# Patient Record
Sex: FEMALE | Race: BLACK OR AFRICAN AMERICAN | NOT HISPANIC OR LATINO | Employment: FULL TIME | URBAN - METROPOLITAN AREA
[De-identification: names, ages, dates, MRNs, and addresses within clinical notes are randomized per-mention and may not be internally consistent; named-entity substitution may affect disease eponyms.]

---

## 2019-05-03 PROBLEM — M54.2 CERVICAL PAIN (NECK): Status: ACTIVE | Noted: 2018-03-05

## 2019-05-03 PROBLEM — E83.42 HYPOMAGNESEMIA: Status: ACTIVE | Noted: 2018-06-02

## 2019-05-03 PROBLEM — K21.9 GASTROESOPHAGEAL REFLUX DISEASE: Status: ACTIVE | Noted: 2017-12-11

## 2019-05-03 PROBLEM — J02.0 STREPTOCOCCAL SORE THROAT: Status: ACTIVE | Noted: 2017-12-11

## 2019-05-03 PROBLEM — M35.00 SJOGREN'S SYNDROME: Status: ACTIVE | Noted: 2017-10-20

## 2019-05-03 PROBLEM — R89.8 EOSINOPHIL COUNT RAISED: Status: ACTIVE | Noted: 2019-01-07

## 2019-05-03 PROBLEM — R10.9 ABDOMINAL PAIN: Status: ACTIVE | Noted: 2017-12-11

## 2019-05-03 PROBLEM — E87.6 HYPOKALEMIA: Status: ACTIVE | Noted: 2018-06-02

## 2019-05-03 PROBLEM — R00.0 TACHYCARDIA: Status: ACTIVE | Noted: 2018-12-31

## 2019-05-03 PROBLEM — R76.8 FALSE POSITIVE ANA: Status: ACTIVE | Noted: 2018-03-14

## 2019-05-03 PROBLEM — J45.901 EXACERBATION OF ASTHMA: Status: ACTIVE | Noted: 2018-06-02

## 2019-05-03 PROBLEM — J30.81 ALLERGIC RHINITIS DUE TO ANIMAL HAIR AND DANDER: Status: ACTIVE | Noted: 2018-03-26

## 2019-05-03 PROBLEM — R06.00 DYSPNEA: Status: ACTIVE | Noted: 2018-12-31

## 2019-05-03 PROBLEM — J18.9 PNEUMONIA: Status: ACTIVE | Noted: 2019-01-07

## 2019-07-08 PROBLEM — J30.1 SEASONAL ALLERGIC RHINITIS DUE TO POLLEN: Status: ACTIVE | Noted: 2019-07-08

## 2019-07-08 PROBLEM — J01.01 ACUTE RECURRENT MAXILLARY SINUSITIS: Status: ACTIVE | Noted: 2019-07-08

## 2020-02-19 PROBLEM — J33.9 NASAL POLYPOSIS: Status: ACTIVE | Noted: 2020-02-19

## 2020-02-28 PROBLEM — Z98.890 STATUS POST FUNCTIONAL ENDOSCOPIC SINUS SURGERY (FESS): Status: ACTIVE | Noted: 2020-02-28

## 2021-05-12 ENCOUNTER — PATIENT MESSAGE (OUTPATIENT)
Dept: RESEARCH | Facility: HOSPITAL | Age: 41
End: 2021-05-12

## 2023-01-05 PROBLEM — I70.90 ATHEROSCLEROSIS OF ARTERIES: Status: ACTIVE | Noted: 2023-01-05

## 2023-01-05 PROBLEM — R93.89 ABNORMAL CT SCAN: Status: ACTIVE | Noted: 2023-01-05

## 2023-01-05 PROBLEM — J45.909 ASTHMA: Status: ACTIVE | Noted: 2018-06-02

## 2023-02-17 PROBLEM — J18.9 PNEUMONIA: Status: RESOLVED | Noted: 2019-01-07 | Resolved: 2023-02-17

## 2023-02-17 PROBLEM — R10.9 ABDOMINAL PAIN: Status: RESOLVED | Noted: 2017-12-11 | Resolved: 2023-02-17

## 2023-02-17 PROBLEM — J02.0 STREPTOCOCCAL SORE THROAT: Status: RESOLVED | Noted: 2017-12-11 | Resolved: 2023-02-17

## 2023-04-06 ENCOUNTER — SPECIALTY PHARMACY (OUTPATIENT)
Dept: PHARMACY | Facility: CLINIC | Age: 43
End: 2023-04-06

## 2023-04-06 NOTE — TELEPHONE ENCOUNTER
PA submitted Key: [id number] - PA Case ID: [id number]    Galilea, this is Rita Munson with [facility].  We are working on your Fasenra prescription that your doctor has sent us. We will be working with your insurance to get this approved for you. We will be calling you along the way with updates on your medication.  If you have any questions, you can reach us at [phone].    Welcome call outcome: Patient/caregiver reached

## 2023-04-11 NOTE — TELEPHONE ENCOUNTER
PA approved. Request Reference Number: [id number]. FASENRA PEN INJ 30MG/ML is approved through 04/06/2024    Test claim: $0    Medicaid Insurance. BI/FA not required.  Forward to initial.

## 2023-04-14 ENCOUNTER — SPECIALTY PHARMACY (OUTPATIENT)
Dept: PHARMACY | Facility: CLINIC | Age: 43
End: 2023-04-14

## 2023-04-14 DIAGNOSIS — J45.50 SEVERE PERSISTENT ASTHMA WITHOUT COMPLICATION: Primary | ICD-10-CM

## 2023-04-14 NOTE — TELEPHONE ENCOUNTER
Specialty Pharmacy - Initial Clinical Assessment    Specialty Medication Orders Linked to Encounter      Flowsheet Row Most Recent Value   Medication #1 benralizumab (FASENRA PEN) 30 mg/mL AtIn (Order#[id number], Rx#[id number])   Medication #2 benralizumab (FASENRA PEN) 30 mg/mL AtIn (Order#[id number], Rx#[id number])          Patient Diagnosis   J45.50 - Severe persistent asthma without complication    Subjective    Isaak Hernandez is a 43 y.o. female, who is followed by the specialty pharmacy service for management and education.    Recent Encounters       Date Type Provider Description    04/14/2023 Specialty Pharmacy Silke Cody, PharmD Initial Clinical Assessment    04/06/2023 Specialty Pharmacy Rita Munson, YannickD Referral Authorization          Clinical call attempts since last clinical assessment   No call attempts found.     Current Outpatient Medications   Medication Sig    ALBUTEROL INHL Inhale into the lungs.    albuterol (PROVENTIL) 2.5 mg /3 mL (0.083 %) nebulizer solution 3 mLs.    aspirin (ECOTRIN) 81 MG EC tablet Take 81 mg by mouth once daily.    azelastine (ASTELIN) 137 mcg (0.1 %) nasal spray 1 spray (137 mcg total) by Nasal route 2 (two) times daily.    benralizumab (FASENRA PEN) 30 mg/mL AtIn Inject 30 mg into the skin every 28 days. Use this medication q 28 days for 3 doses and follow with 1 injection under the skin q 56 days.    benralizumab (FASENRA PEN) 30 mg/mL AtIn Inject 30 mg into the skin every 8 weeks.    cetirizine (ZYRTEC) 10 MG tablet Take 1 tablet (10 mg total) by mouth once daily.    fluticasone propionate (FLONASE) 50 mcg/actuation nasal spray 2 sprays (100 mcg total) by Each Nostril route once daily. Spray towards the ears on either side.    fluticasone propionate (FLONASE) 50 mcg/actuation nasal spray 1 spray (50 mcg total) by Each Nostril route 2 (two) times a day.    fluticasone-umeclidin-vilanter (TRELEGY ELLIPTA) 200-62.5-25 mcg inhaler Inhale 1 puff into the lungs  once daily.    fluticasone-umeclidin-vilanter (TRELEGY ELLIPTA) 200-62.5-25 mcg inhaler Inhale 1 puff into the lungs once daily.    montelukast (SINGULAIR) 10 mg tablet Take 1 tablet (10 mg total) by mouth every evening.    predniSONE (DELTASONE) 20 MG tablet Take 2 tablets (40 mg total) by mouth once daily.   Last reviewed on 4/14/2023 10:05 AM by Silke Cody, PharmD    Review of patient's allergies indicates:  No Known AllergiesLast reviewed on  4/14/2023 10:05 AM by Silke Cody          Assessment Questions - Documented Responses      Flowsheet Row Most Recent Value   Assessment    Medication Reconciliation completed for patient Yes   During the past 4 weeks, has patient missed any activities due to condition or medication? No   During the past 4 weeks, did patient have any of the following urgent care visits? ER Admission   Goals of Therapy Status Discussed (new start)   Status of the patients ability to self-administer: Is Able   All education points have been covered with patient? Yes, supplemental printed education provided   Welcome packet contents reviewed and discussed with patient? Yes   Assesment completed? Yes   Plan Therapy being initiated   Do you need to open a clinical intervention (i-vent)? No   Do you want to schedule first shipment? Yes   Medication #1 Assessment Info    Patient status New medication, New to [facility]   Is this medication appropriate for the patient? Yes   Is this medication effective? Not yet started          Refill Questions - Documented Responses      Flowsheet Row Most Recent Value   Patient Availability and HIPAA Verification    Does patient want to proceed with activity? Yes   HIPAA/medical authority confirmed? Yes   Relationship to patient of person spoken to? Self   Refill Screening Questions    When does the patient need to receive the medication? 04/19/23   Refill Delivery Questions    How will the patient receive the medication? Mail   When does the patient need  "to receive the medication? 04/19/23   Shipping Address Home   Address in [facility] confirmed and updated if neccessary? Yes   Expected Copay ($) 0   Is the patient able to afford the medication copay? Yes   Payment Method zero copay   Days supply of Refill 28   Supplies needed? No supplies needed   Refill activity completed? Yes   Refill activity plan Refill scheduled   Shipment/Pickup Date: 04/17/23            Objective    She has a past medical history of Allergy, Asthma, and Sinusitis.    Tried/failed medications: Trelegy, singulair, prednisone, symbicort, breo, albuterol    BP Readings from Last 4 Encounters:   04/03/23 136/76   02/17/23 136/85   01/05/23 (!) 144/88   07/16/21 (!) 142/74     Ht Readings from Last 4 Encounters:   04/03/23 5' 5" (1.651 m)   02/17/23 5' 5" (1.651 m)   01/05/23 5' 5" (1.651 m)   07/16/21 5' 5" (1.651 m)     Wt Readings from Last 4 Encounters:   04/03/23 76.7 kg (169 lb)   02/17/23 76.8 kg (169 lb 6.4 oz)   01/05/23 73.5 kg (162 lb)   07/16/21 70.8 kg (156 lb)       The goals of prescribed drug therapy management include:  Supporting patient to meet the prescriber's medical treatment objectives  Improving or maintaining quality of life  Maintaining optimal therapy adherence  Minimizing and managing side effects      Goals of Therapy Status: Discussed (new start)    Assessment/Plan  Patient plans to start therapy on 04/19/23      Indication, dosage, appropriateness, effectiveness, safety and convenience of her specialty medication(s) were reviewed today.     Patient Education   Patient received education on the following:   Expectations and possible outcomes of therapy  Proper use, timely administration, and missed dose management  Duration of therapy  Side effects, including prevention, minimization, and management  Contraindications and safety precautions  New or changed medications, including prescribe and over the counter medications and supplements  Reviews recommended " vaccinations, as appropriate  Storage, safe handling, and disposal      Tasks added this encounter   No tasks added.   Tasks due within next 3 months   4/12/2023 - Clinical - Initial Assessment     Silke Cody, PharmD  [facility]  [address] [facility]  [facility] [address]  Phone: [phone]  Fax: [phone]

## 2023-05-10 ENCOUNTER — SPECIALTY PHARMACY (OUTPATIENT)
Dept: PHARMACY | Facility: CLINIC | Age: 43
End: 2023-05-10

## 2023-05-10 NOTE — TELEPHONE ENCOUNTER
Specialty Pharmacy - Refill Coordination    Specialty Medication Orders Linked to Encounter      Flowsheet Row Most Recent Value   Medication #1 benralizumab (FASENRA PEN) 30 mg/mL AtIn (Order#[id number], Rx#[id number])            Refill Questions - Documented Responses      Flowsheet Row Most Recent Value   Patient Availability and HIPAA Verification    Does patient want to proceed with activity? Yes   HIPAA/medical authority confirmed? Yes   Relationship to patient of person spoken to? Self   Refill Screening Questions    Changes to allergies? No   Changes to medications? No   New conditions since last clinic visit? No   Unplanned office visit, urgent care, ED, or hospital admission in the last 4 weeks? Yes  [Pt was hospitalized with a bronchial infection during the last week of April]   How does patient/caregiver feel medication is working? Too soon to tell   Financial problems or insurance changes? No   How many doses of your specialty medications were missed in the last 4 weeks? 0   Would patient like to speak to a pharmacist? No   When does the patient need to receive the medication? 05/17/23   Refill Delivery Questions    How will the patient receive the medication? Mail   When does the patient need to receive the medication? 05/17/23   Shipping Address Home   Address in [place] confirmed and updated if neccessary? Yes   Expected Copay ($) 0   Is the patient able to afford the medication copay? Yes   Payment Method zero copay   Days supply of Refill 28   Supplies needed? No supplies needed   Refill activity completed? Yes   Refill activity plan Refill scheduled   Shipment/Pickup Date: 05/11/23            Current Outpatient Medications   Medication Sig    albuterol (PROVENTIL) 2.5 mg /3 mL (0.083 %) nebulizer solution 3 mLs.    ALBUTEROL INHL Inhale into the lungs.    aspirin (ECOTRIN) 81 MG EC tablet Take 81 mg by mouth once daily.    azelastine (ASTELIN) 137 mcg (0.1 %) nasal spray 1 spray (137 mcg  total) by Nasal route 2 (two) times daily.    benralizumab (FASENRA PEN) 30 mg/mL AtIn Inject 30 mg into the skin every 28 days. Use this medication every 28 days for 3 doses and follow with 1 injection under the skin every 56 days.    benralizumab (FASENRA PEN) 30 mg/mL AtIn Inject 30 mg into the skin every 8 weeks.    cetirizine (ZYRTEC) 10 MG tablet Take 1 tablet (10 mg total) by mouth once daily.    fluticasone propionate (FLONASE) 50 mcg/actuation nasal spray 2 sprays (100 mcg total) by Each Nostril route once daily. Spray towards the ears on either side.    fluticasone propionate (FLONASE) 50 mcg/actuation nasal spray 1 spray (50 mcg total) by Each Nostril route 2 (two) times a day.    fluticasone-umeclidin-vilanter (TRELEGY ELLIPTA) 200-62.5-25 mcg inhaler Inhale 1 puff into the lungs once daily.    fluticasone-umeclidin-vilanter (TRELEGY ELLIPTA) 200-62.5-25 mcg inhaler Inhale 1 puff into the lungs once daily.    predniSONE (DELTASONE) 20 MG tablet Take 2 tablets (40 mg total) by mouth once daily.   Last reviewed on 4/14/2023 10:05 AM by Silke Cody PharmD    Review of patient's allergies indicates:  No Known Allergies Last reviewed on  4/14/2023 10:05 AM by Silke Cody      Tasks added this encounter   No tasks added.   Tasks due within next 3 months   No tasks due.     Silke Cody, PharmD  [facility]  [address] [place] [address]  Phone: [phone]  Fax: [phone]

## 2023-06-05 ENCOUNTER — SPECIALTY PHARMACY (OUTPATIENT)
Dept: PHARMACY | Facility: CLINIC | Age: 43
End: 2023-06-05

## 2023-06-05 NOTE — TELEPHONE ENCOUNTER
Specialty Pharmacy - Refill Coordination    Specialty Medication Orders Linked to Encounter      Flowsheet Row Most Recent Value   Medication #1 benralizumab (FASENRA PEN) 30 mg/mL AtIn (Order#[id number], Rx#[id number])            Refill Questions - Documented Responses      Flowsheet Row Most Recent Value   Refill Screening Questions    Changes to allergies? No   Changes to medications? No   New conditions since last clinic visit? No   Unplanned office visit, urgent care, ED, or hospital admission in the last 4 weeks? No   How does patient/caregiver feel medication is working? Good   Financial problems or insurance changes? No   How many doses of your specialty medications were missed in the last 4 weeks? 0   Would patient like to speak to a pharmacist? No   When does the patient need to receive the medication? 06/16/23   Refill Delivery Questions    How will the patient receive the medication? Mail   When does the patient need to receive the medication? 06/16/23   Shipping Address Home   Address in [facility] confirmed and updated if neccessary? Yes   Expected Copay ($) 0   Is the patient able to afford the medication copay? Yes   Payment Method zero copay   Days supply of Refill 56   Supplies needed? No supplies needed   Refill activity completed? Yes   Refill activity plan Refill scheduled   Shipment/Pickup Date: 06/12/23            Current Outpatient Medications   Medication Sig    albuterol (PROVENTIL) 2.5 mg /3 mL (0.083 %) nebulizer solution 3 mLs.    ALBUTEROL INHL Inhale into the lungs.    aspirin (ECOTRIN) 81 MG EC tablet Take 81 mg by mouth once daily.    azelastine (ASTELIN) 137 mcg (0.1 %) nasal spray 1 spray (137 mcg total) by Nasal route 2 (two) times daily.    benralizumab (FASENRA PEN) 30 mg/mL AtIn Inject 30 mg into the skin every 28 days. Use this medication every 28 days for 3 doses and follow with 1 injection under the skin every 56 days.    benralizumab (FASENRA PEN) 30 mg/mL AtIn  Inject 30 mg into the skin every 8 weeks.    cetirizine (ZYRTEC) 10 MG tablet Take 1 tablet (10 mg total) by mouth once daily.    fluticasone propionate (FLONASE) 50 mcg/actuation nasal spray 2 sprays (100 mcg total) by Each Nostril route once daily. Spray towards the ears on either side.    fluticasone propionate (FLONASE) 50 mcg/actuation nasal spray 1 spray (50 mcg total) by Each Nostril route 2 (two) times a day.    fluticasone-umeclidin-vilanter (TRELEGY ELLIPTA) 200-62.5-25 mcg inhaler Inhale 1 puff into the lungs once daily.    predniSONE (DELTASONE) 20 MG tablet Take 2 tablets (40 mg total) by mouth once daily.   Last reviewed on 4/14/2023 10:05 AM by Silke Cody, Asa    Review of patient's allergies indicates:  No Known Allergies Last reviewed on  4/14/2023 10:05 AM by Silke Cody      Tasks added this encounter   No tasks added.   Tasks due within next 3 months   No tasks due.     Rita Munson, PharmD  [facility]  [address] [place] [id number]  Phone: [phone]  Fax: [phone]

## 2023-07-31 ENCOUNTER — SPECIALTY PHARMACY (OUTPATIENT)
Dept: PHARMACY | Facility: CLINIC | Age: 43
End: 2023-07-31

## 2023-07-31 NOTE — TELEPHONE ENCOUNTER
Outgoing call to pt regarding Fasenra refill. Pt is due 8/11. Pt ok with callback on 8/4 for refill.

## 2023-08-04 NOTE — TELEPHONE ENCOUNTER
Specialty Pharmacy - Refill Coordination    Specialty Medication Orders Linked to Encounter      Flowsheet Row Most Recent Value   Medication #1 benralizumab (FASENRA PEN) 30 mg/mL AtIn (Order#[id number], Rx#[id number])            Refill Questions - Documented Responses      Flowsheet Row Most Recent Value   Patient Availability and HIPAA Verification    Does patient want to proceed with activity? Yes   HIPAA/medical authority confirmed? Yes   Relationship to patient of person spoken to? Self   Refill Screening Questions    Changes to allergies? No   Changes to medications? No   New conditions since last clinic visit? No   Unplanned office visit, urgent care, ED, or hospital admission in the last 4 weeks? No   How does patient/caregiver feel medication is working? Good   Financial problems or insurance changes? No   How many doses of your specialty medications were missed in the last 4 weeks? 0   Would patient like to speak to a pharmacist? No   When does the patient need to receive the medication? 08/11/23   Refill Delivery Questions    How will the patient receive the medication? Mail   When does the patient need to receive the medication? 08/11/23   Shipping Address Home   Address in [facility] confirmed and updated if neccessary? Yes   Expected Copay ($) 0   Is the patient able to afford the medication copay? Yes   Payment Method zero copay   Days supply of Refill 56   Supplies needed? No supplies needed   Refill activity completed? Yes   Refill activity plan Refill scheduled   Shipment/Pickup Date: 08/09/23            Current Outpatient Medications   Medication Sig    albuterol (PROVENTIL) 2.5 mg /3 mL (0.083 %) nebulizer solution 3 mLs.    ALBUTEROL INHL Inhale into the lungs.    aspirin (ECOTRIN) 81 MG EC tablet Take 81 mg by mouth once daily.    azelastine (ASTELIN) 137 mcg (0.1 %) nasal spray 1 spray (137 mcg total) by Nasal route 2 (two) times daily.    benralizumab (FASENRA PEN) 30 mg/mL AtIn  Inject 30 mg into the skin every 28 days. Use this medication every 28 days for 3 doses and follow with 1 injection under the skin every 56 days.    benralizumab (FASENRA PEN) 30 mg/mL AtIn Inject 30 mg into the skin every 8 weeks.    cetirizine (ZYRTEC) 10 MG tablet Take 1 tablet (10 mg total) by mouth once daily.    fluticasone propionate (FLONASE) 50 mcg/actuation nasal spray 2 sprays (100 mcg total) by Each Nostril route once daily. Spray towards the ears on either side.    fluticasone propionate (FLONASE) 50 mcg/actuation nasal spray 1 spray (50 mcg total) by Each Nostril route 2 (two) times a day.    fluticasone-umeclidin-vilanter (TRELEGY ELLIPTA) 200-62.5-25 mcg inhaler Inhale 1 puff into the lungs once daily.    predniSONE (DELTASONE) 20 MG tablet Take 2 tablets (40 mg total) by mouth once daily.   Last reviewed on 4/14/2023 10:05 AM by Silke Cody, PharmD    Review of patient's allergies indicates:  No Known Allergies Last reviewed on  4/14/2023 10:05 AM by Silke Cody      Tasks added this encounter   No tasks added.   Tasks due within next 3 months   8/4/2023 - Refill Coordination Outreach (1 time occurrence)     [facility]  [address] [place] [address]  Phone: [phone]  Fax: [phone]